# Patient Record
Sex: FEMALE | URBAN - METROPOLITAN AREA
[De-identification: names, ages, dates, MRNs, and addresses within clinical notes are randomized per-mention and may not be internally consistent; named-entity substitution may affect disease eponyms.]

---

## 2022-08-15 ENCOUNTER — OFFICE VISIT (OUTPATIENT)
Dept: URBAN - METROPOLITAN AREA CLINIC 91 | Facility: CLINIC | Age: 42
End: 2022-08-15
Payer: COMMERCIAL

## 2022-08-15 DIAGNOSIS — H16.123 FILAMENTARY KERATITIS, BILATERAL: Primary | ICD-10-CM

## 2022-08-15 PROCEDURE — 99203 OFFICE O/P NEW LOW 30 MIN: CPT | Performed by: SPECIALIST

## 2022-08-15 RX ORDER — PREDNISOLONE ACETATE 10 MG/ML
1 % SUSPENSION/ DROPS OPHTHALMIC
Qty: 15 | Refills: 1 | Status: ACTIVE
Start: 2022-08-15

## 2022-08-15 RX ORDER — LIFITEGRAST 50 MG/ML
5 % SOLUTION/ DROPS OPHTHALMIC
Qty: 60 | Refills: 3 | Status: INACTIVE
Start: 2022-08-15 | End: 2022-08-15

## 2022-08-15 RX ORDER — ERYTHROMYCIN 5 MG/G
OINTMENT OPHTHALMIC
Qty: 7 | Refills: 1 | Status: ACTIVE
Start: 2022-08-15

## 2022-08-15 ASSESSMENT — VISUAL ACUITY
OS: 20/60
OD: 20/60

## 2022-08-15 NOTE — IMPRESSION/PLAN
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 Plan: Recommend patient start Prednisolone drops QID OU with a slow taper over time. Patient also prescribed Xiidra OU BID (if not covered, start Cequa OU BID). Continue Erythromycin BREE 5x per day OU. Due to rash, patient may have severe atopy and PCP to consider singulair.

## 2022-08-30 ENCOUNTER — OFFICE VISIT (OUTPATIENT)
Dept: URBAN - METROPOLITAN AREA CLINIC 91 | Facility: CLINIC | Age: 42
End: 2022-08-30
Payer: COMMERCIAL

## 2022-08-30 DIAGNOSIS — H16.123 FILAMENTARY KERATITIS, BILATERAL: Primary | ICD-10-CM

## 2022-08-30 PROCEDURE — 99213 OFFICE O/P EST LOW 20 MIN: CPT | Performed by: SPECIALIST

## 2022-08-30 RX ORDER — CYCLOSPORINE 0 G/ML
0.09 % SOLUTION/ DROPS OPHTHALMIC; TOPICAL
Qty: 180 | Refills: 3 | Status: ACTIVE
Start: 2022-08-30

## 2022-08-30 ASSESSMENT — INTRAOCULAR PRESSURE
OS: 16
OD: 18

## 2022-08-30 NOTE — IMPRESSION/PLAN
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 Plan: Patient to stop E-mycin sharron, and reduce PF gtts QID OU then taper down 4-3-2-1. Start using AT's gtts OU 3-6x a day, in addition start Cequa BID OU. Recommend to see dermatologist for rash. Will continue to monitor.